# Patient Record
Sex: FEMALE | Race: WHITE | Employment: OTHER | URBAN - METROPOLITAN AREA
[De-identification: names, ages, dates, MRNs, and addresses within clinical notes are randomized per-mention and may not be internally consistent; named-entity substitution may affect disease eponyms.]

---

## 2020-02-22 ENCOUNTER — HOSPITAL ENCOUNTER (EMERGENCY)
Age: 35
Discharge: HOME | End: 2020-02-22
Attending: EMERGENCY MEDICINE
Payer: COMMERCIAL

## 2020-02-22 ENCOUNTER — APPOINTMENT (OUTPATIENT)
Dept: GENERAL RADIOLOGY | Age: 35
End: 2020-02-22
Payer: COMMERCIAL

## 2020-02-22 VITALS
HEART RATE: 97 BPM | WEIGHT: 125 LBS | DIASTOLIC BLOOD PRESSURE: 72 MMHG | HEIGHT: 68 IN | OXYGEN SATURATION: 100 % | BODY MASS INDEX: 18.94 KG/M2 | SYSTOLIC BLOOD PRESSURE: 122 MMHG | TEMPERATURE: 97.8 F | RESPIRATION RATE: 18 BRPM

## 2020-02-22 LAB
RAPID INFLUENZA  B AGN: NEGATIVE
RAPID INFLUENZA A AGN: NEGATIVE
S PYO AG THROAT QL: NEGATIVE

## 2020-02-22 PROCEDURE — 6370000000 HC RX 637 (ALT 250 FOR IP): Performed by: EMERGENCY MEDICINE

## 2020-02-22 PROCEDURE — 87880 STREP A ASSAY W/OPTIC: CPT

## 2020-02-22 PROCEDURE — 6360000002 HC RX W HCPCS: Performed by: EMERGENCY MEDICINE

## 2020-02-22 PROCEDURE — 71046 X-RAY EXAM CHEST 2 VIEWS: CPT

## 2020-02-22 PROCEDURE — 99283 EMERGENCY DEPT VISIT LOW MDM: CPT

## 2020-02-22 PROCEDURE — 87081 CULTURE SCREEN ONLY: CPT

## 2020-02-22 PROCEDURE — 96372 THER/PROPH/DIAG INJ SC/IM: CPT

## 2020-02-22 PROCEDURE — 87804 INFLUENZA ASSAY W/OPTIC: CPT

## 2020-02-22 RX ORDER — ONDANSETRON 4 MG/1
4 TABLET, ORALLY DISINTEGRATING ORAL ONCE
Status: COMPLETED | OUTPATIENT
Start: 2020-02-22 | End: 2020-02-22

## 2020-02-22 RX ORDER — OXYMETAZOLINE HYDROCHLORIDE 0.05 G/100ML
2 SPRAY NASAL ONCE
Status: COMPLETED | OUTPATIENT
Start: 2020-02-22 | End: 2020-02-22

## 2020-02-22 RX ORDER — DEXAMETHASONE SODIUM PHOSPHATE 4 MG/ML
10 INJECTION, SOLUTION INTRA-ARTICULAR; INTRALESIONAL; INTRAMUSCULAR; INTRAVENOUS; SOFT TISSUE ONCE
Status: COMPLETED | OUTPATIENT
Start: 2020-02-22 | End: 2020-02-22

## 2020-02-22 RX ADMIN — OXYMETAZOLINE HCL 2 SPRAY: 0.05 SPRAY NASAL at 04:23

## 2020-02-22 RX ADMIN — DEXAMETHASONE SODIUM PHOSPHATE 10 MG: 4 INJECTION, SOLUTION INTRAMUSCULAR; INTRAVENOUS at 05:06

## 2020-02-22 RX ADMIN — ONDANSETRON 4 MG: 4 TABLET, ORALLY DISINTEGRATING ORAL at 03:48

## 2020-02-22 SDOH — HEALTH STABILITY: MENTAL HEALTH: HOW OFTEN DO YOU HAVE A DRINK CONTAINING ALCOHOL?: NEVER

## 2020-02-22 NOTE — ED PROVIDER NOTES
[address] ENCOUNTER          ATTENDING PHYSICIAN NOTE       Date of evaluation: 2/22/2020    Chief Complaint     Other  Cough    History of Present Illness     Emeli Wong is a 36 y.o. female who presents to the emergency department with complaints of cough for several days patient states that she took Mucinex this evening felt nauseous thereafter has felt somewhat woozy thereafter did have an episode where she coughed so hard that she threw up. Has reports of nasal congestion and postnasal drip. Has complaints of a sore throat. Denies any body aches or body chills. Denies any other GI symptoms no diarrhea. Review of Systems     As documented in the HPI, otherwise all other systems were reviewed and were negative. Past Medical, Surgical, Family, and Social History     She has no past medical history on file. She has no past surgical history on file. Her family history is not on file. She reports that she has never smoked. She has never used smokeless tobacco. She reports that she does not drink alcohol or use drugs. Medications     There are no discharge medications for this patient. Allergies     She has No Known Allergies.     Physical Exam     INITIAL VITALS: BP: 122/72, Temp: 97.8 °F (36.6 °C), Pulse: 97, Resp: 18, SpO2: 100 %   General: 80-year-old female sitting in bed no apparent cardiorespiratory distress  HEENT:  head is atraumatic, sclera are clear, oropharynx is mildly erythematous with some mild swelling of the uvula and posterior oropharyngeal tissues, mucus membranes are moist, the nasal turbinates are somewhat hyperemic, the right tympanic membrane has clear fluid noted behind it the left tympanic membrane is normal  Neck: Trachea midline, no stridor nontender to palpation no lymph nodes appreciated  Chest: Nonlabored respirations, clear to auscultation bilaterally without wheezes rhonchi or rales  Cardiovascular: Regular, rate, and rhythm, 2+ instructed on medications that she could take to help alleviate her symptoms. Instructed to rest and drink plenty of fluids. Patient expressed understanding of discharge instructions was felt safe for discharge home    Clinical Impression     1. Viral URI with cough        Disposition     PATIENT REFERRED TO:  [facility]  [address] [place] [address]  [phone]    If symptoms worsen      DISCHARGE MEDICATIONS:  There are no discharge medications for this patient.       DISPOSITION Decision To Discharge 02/22/2020 05:30:09 AM           Phil Horner MD  02/22/20 104 Elin Keita MD  02/26/20 [id number]

## 2020-02-25 LAB — S PYO THROAT QL CULT: NORMAL
